# Patient Record
Sex: MALE | Race: OTHER | Employment: FULL TIME | URBAN - METROPOLITAN AREA
[De-identification: names, ages, dates, MRNs, and addresses within clinical notes are randomized per-mention and may not be internally consistent; named-entity substitution may affect disease eponyms.]

---

## 2017-05-11 ENCOUNTER — OFFICE VISIT (OUTPATIENT)
Dept: OTOLARYNGOLOGY | Facility: CLINIC | Age: 62
End: 2017-05-11

## 2017-05-11 VITALS
DIASTOLIC BLOOD PRESSURE: 87 MMHG | HEIGHT: 71 IN | BODY MASS INDEX: 21.7 KG/M2 | TEMPERATURE: 98 F | WEIGHT: 155 LBS | SYSTOLIC BLOOD PRESSURE: 133 MMHG

## 2017-05-11 DIAGNOSIS — H61.20 WAX IN EAR: Primary | ICD-10-CM

## 2017-05-11 PROCEDURE — 69210 REMOVE IMPACTED EAR WAX UNI: CPT | Performed by: OTOLARYNGOLOGY

## 2017-05-11 PROCEDURE — 99202 OFFICE O/P NEW SF 15 MIN: CPT | Performed by: OTOLARYNGOLOGY

## 2017-05-11 NOTE — PROGRESS NOTES
Shlomo Sheffield is a 63year old male. Patient presents with:  Ear Problem: ringing in both ears and pressure in the right ear for 2 weeks         HISTORY OF PRESENT ILLNESS     Here for evaluation of right sided pressure and fluttering sensation. . Patient mmHg  Temp(Src) 97.7 °F (36.5 °C) (Oral)  Ht 5' 11\" (1.803 m)  Wt 155 lb (70.308 kg)  BMI 21.63 kg/m2    System Findings Details   Skin Normal Inspection - Normal. No suspicious lesions bruises or masses.    Constitutional Normal Overall appearance - [facility]

## 2017-08-21 ENCOUNTER — OFFICE VISIT (OUTPATIENT)
Dept: AUDIOLOGY | Facility: CLINIC | Age: 62
End: 2017-08-21

## 2017-08-21 ENCOUNTER — OFFICE VISIT (OUTPATIENT)
Dept: OTOLARYNGOLOGY | Facility: CLINIC | Age: 62
End: 2017-08-21

## 2017-08-21 VITALS
DIASTOLIC BLOOD PRESSURE: 82 MMHG | TEMPERATURE: 97 F | BODY MASS INDEX: 21.7 KG/M2 | HEART RATE: 61 BPM | SYSTOLIC BLOOD PRESSURE: 133 MMHG | HEIGHT: 71 IN | WEIGHT: 155 LBS

## 2017-08-21 DIAGNOSIS — H93.13 TINNITUS OF BOTH EARS: Primary | ICD-10-CM

## 2017-08-21 DIAGNOSIS — H93.13 TINNITUS, BILATERAL: Primary | ICD-10-CM

## 2017-08-21 DIAGNOSIS — H91.93 BILATERAL HEARING LOSS, UNSPECIFIED HEARING LOSS TYPE: ICD-10-CM

## 2017-08-21 DIAGNOSIS — H90.3 SENSORINEURAL HEARING LOSS, BILATERAL: ICD-10-CM

## 2017-08-21 PROCEDURE — 92567 TYMPANOMETRY: CPT | Performed by: AUDIOLOGIST

## 2017-08-21 PROCEDURE — 92557 COMPREHENSIVE HEARING TEST: CPT | Performed by: AUDIOLOGIST

## 2017-08-21 PROCEDURE — 99213 OFFICE O/P EST LOW 20 MIN: CPT | Performed by: OTOLARYNGOLOGY

## 2017-08-21 PROCEDURE — 99212 OFFICE O/P EST SF 10 MIN: CPT | Performed by: OTOLARYNGOLOGY

## 2017-08-21 NOTE — PROGRESS NOTES
Thomas Liang is a 63year old male. Patient presents with: Follow - Up: Tinnitus is the same since getting waxed cleaned out. Walked out without a hearing test at last visit.          HISTORY OF PRESENT ILLNESS  5/11/17   Here for evaluation of right patsy Frequent skin infections, pigment change and rash. Hema/Lymph Negative Easy bleeding and easy bruising.      PHYSICAL EXAM    /82   Pulse 61   Temp (!) 97.2 °F (36.2 °C) (Oral)   Ht 5' 11\" (1.803 m)   Wt 155 lb (70.3 kg)   BMI 21.62 kg/m²     Syste cerebellopontine angle tumor. I usually proceed with an MRI in this case. For symmetric tenderness masking is the only available modality to reduce symptoms in an otherwise nondepressed/anxious patient.  The only medical modality which has proven to be effe

## 2017-08-22 NOTE — PROGRESS NOTES
AUDIOGRAM     Julianne Johnson was referred for testing by Maria Isabel You due to bilateral tinnitus and hearing loss.    10/25/1955  [id number]    Pt noted the tinnitus is not bothersome and he notices it only in quiet surroundings when he is not engaged in a

## 2017-08-24 ENCOUNTER — TELEPHONE (OUTPATIENT)
Dept: NEPHROLOGY | Facility: CLINIC | Age: 62
End: 2017-08-24

## 2017-08-24 DIAGNOSIS — Z01.89 ROUTINE LAB DRAW: ICD-10-CM

## 2017-08-24 DIAGNOSIS — Z12.5 PROSTATE CANCER SCREENING: Primary | ICD-10-CM

## 2017-08-24 RX ORDER — ACYCLOVIR 400 MG/1
TABLET ORAL
Qty: 60 TABLET | Refills: 0 | Status: SHIPPED | OUTPATIENT
Start: 2017-08-24 | End: 2018-12-30

## 2017-08-24 RX ORDER — ZOLPIDEM TARTRATE 10 MG/1
TABLET ORAL
Qty: 30 TABLET | Refills: 0 | Status: CANCELLED | OUTPATIENT
Start: 2017-08-24

## 2017-08-24 NOTE — TELEPHONE ENCOUNTER
Pt called for refills. Aware MKK out of office.       Current Outpatient Prescriptions:   •  Zolpidem Tartrate 10 MG Oral Tab, TAKE 1 TABLET BY MOUTH EVERY NIGHT AT BEDTIME, Disp: 30 tablet, Rfl: 0  •  acyclovir 400 MG Oral Tab, TAKE ONE TABLET TWICE DAILY

## 2017-08-24 NOTE — TELEPHONE ENCOUNTER
Pt called for routine lab orders to be done before appt on 9/22/17. Please call. Aware MKK out of office this week.

## 2017-08-28 RX ORDER — ZOLPIDEM TARTRATE 10 MG/1
TABLET ORAL
Qty: 30 TABLET | Refills: 0 | Status: SHIPPED | OUTPATIENT
Start: 2017-08-28 | End: 2018-12-30

## 2017-08-28 NOTE — TELEPHONE ENCOUNTER
Left detailed message on voicemail--lab work ordered by Dr. Diann Solis. Patient needs to fast 12 hours and do labs a few days prior to appointment on 9/22/17. Requested a call back to confirm that patient got this message.

## 2017-08-30 NOTE — TELEPHONE ENCOUNTER
LMTCB (follow up phone call to see if patient heard the voicemail message left on Monday) Requested a call back to confirm that he got the message and to check if he has any questions or concerns.

## 2017-10-01 ENCOUNTER — LAB ENCOUNTER (OUTPATIENT)
Dept: LAB | Facility: HOSPITAL | Age: 62
End: 2017-10-01
Attending: INTERNAL MEDICINE
Payer: COMMERCIAL

## 2017-10-01 DIAGNOSIS — Z01.89 ROUTINE LAB DRAW: ICD-10-CM

## 2017-10-01 DIAGNOSIS — Z12.5 PROSTATE CANCER SCREENING: ICD-10-CM

## 2017-10-01 PROCEDURE — 80053 COMPREHEN METABOLIC PANEL: CPT

## 2017-10-01 PROCEDURE — 80061 LIPID PANEL: CPT

## 2017-10-01 PROCEDURE — 81003 URINALYSIS AUTO W/O SCOPE: CPT

## 2017-10-01 PROCEDURE — 85025 COMPLETE CBC W/AUTO DIFF WBC: CPT

## 2017-10-01 PROCEDURE — 36415 COLL VENOUS BLD VENIPUNCTURE: CPT

## 2017-10-09 ENCOUNTER — OFFICE VISIT (OUTPATIENT)
Dept: NEPHROLOGY | Facility: CLINIC | Age: 62
End: 2017-10-09

## 2017-10-09 VITALS
WEIGHT: 163 LBS | HEIGHT: 71 IN | DIASTOLIC BLOOD PRESSURE: 86 MMHG | BODY MASS INDEX: 22.82 KG/M2 | SYSTOLIC BLOOD PRESSURE: 144 MMHG | HEART RATE: 61 BPM

## 2017-10-09 DIAGNOSIS — Z00.00 ROUTINE GENERAL MEDICAL EXAMINATION AT A HEALTH CARE FACILITY: Primary | ICD-10-CM

## 2017-10-09 PROCEDURE — 99396 PREV VISIT EST AGE 40-64: CPT | Performed by: INTERNAL MEDICINE

## 2017-10-09 NOTE — PROGRESS NOTES
[facility]  Nephrology Daily Progress Note    Júnior Faust  [id number]  63year old  Patient presents with: Annual      HPI:   Júnior Faust is a 63year old male.   65-year-old male with a history of mild hypercholesterolemia, intermittent herpes, 71.000 - 71.000   BODY MASS INDEX - 22.73 -       VITALS: WEIGHT ONLY 5/11/2017 8/21/2017 10/9/2017   Weight 155 lbs 155 lbs 163 lbs       Constitutional: appears well hydrated alert and responsive no acute distress noted  Neck/Thyroid: neck is supple with Disp: 60 tablet, Rfl: 0    Allergies:  No Known Allergies         ASSESSMENT/PLAN:   Assessment   Routine general medical examination at a health care facility  (primary encounter diagnosis)    Patient Active Problem List:     Routine general medical exami

## 2018-04-10 ENCOUNTER — OFFICE VISIT (OUTPATIENT)
Dept: GASTROENTEROLOGY | Facility: CLINIC | Age: 63
End: 2018-04-10

## 2018-04-10 VITALS
BODY MASS INDEX: 23.24 KG/M2 | SYSTOLIC BLOOD PRESSURE: 133 MMHG | HEIGHT: 71 IN | HEART RATE: 72 BPM | DIASTOLIC BLOOD PRESSURE: 77 MMHG | WEIGHT: 166 LBS

## 2018-04-10 DIAGNOSIS — Z12.11 COLON CANCER SCREENING: Primary | ICD-10-CM

## 2018-04-10 PROCEDURE — 99203 OFFICE O/P NEW LOW 30 MIN: CPT | Performed by: NURSE PRACTITIONER

## 2018-04-10 PROCEDURE — 99212 OFFICE O/P EST SF 10 MIN: CPT | Performed by: NURSE PRACTITIONER

## 2018-04-28 ENCOUNTER — TELEPHONE (OUTPATIENT)
Dept: NEPHROLOGY | Facility: CLINIC | Age: 63
End: 2018-04-28

## 2018-04-28 ENCOUNTER — APPOINTMENT (OUTPATIENT)
Dept: LAB | Facility: HOSPITAL | Age: 63
End: 2018-04-28
Attending: INTERNAL MEDICINE
Payer: COMMERCIAL

## 2018-04-28 DIAGNOSIS — Z00.00 ROUTINE GENERAL MEDICAL EXAMINATION AT A HEALTH CARE FACILITY: ICD-10-CM

## 2018-04-28 DIAGNOSIS — E78.00 PURE HYPERCHOLESTEROLEMIA: Primary | ICD-10-CM

## 2018-04-28 PROCEDURE — 36415 COLL VENOUS BLD VENIPUNCTURE: CPT

## 2018-04-28 PROCEDURE — 82947 ASSAY GLUCOSE BLOOD QUANT: CPT

## 2018-04-28 PROCEDURE — 80061 LIPID PANEL: CPT

## 2018-04-28 NOTE — TELEPHONE ENCOUNTER
FBS normal but chol still too high and higher than before. Start Lipitor 20 mg 1 qd #30, refills 5. Liver and lipid panel in 1 mo. Watch diet and exercise.

## 2018-04-30 RX ORDER — ATORVASTATIN CALCIUM 20 MG/1
20 TABLET, FILM COATED ORAL NIGHTLY
Qty: 30 TABLET | Refills: 5 | Status: SHIPPED | OUTPATIENT
Start: 2018-04-30 | End: 2018-11-13

## 2018-04-30 NOTE — TELEPHONE ENCOUNTER
Patient returned RN call, Message given as written per Dr Guero Waller;, Verbalized understanding of all instructions as given.  New Rx for Lipitor sent to [address] [place] [address] in [facility] as per patient request.

## 2018-05-14 ENCOUNTER — OFFICE VISIT (OUTPATIENT)
Dept: NEPHROLOGY | Facility: CLINIC | Age: 63
End: 2018-05-14

## 2018-05-14 VITALS
HEIGHT: 71 IN | HEART RATE: 60 BPM | DIASTOLIC BLOOD PRESSURE: 78 MMHG | SYSTOLIC BLOOD PRESSURE: 134 MMHG | BODY MASS INDEX: 23.1 KG/M2 | WEIGHT: 165 LBS

## 2018-05-14 DIAGNOSIS — E78.00 HYPERCHOLESTEROLEMIA: Primary | ICD-10-CM

## 2018-05-14 PROCEDURE — 99212 OFFICE O/P EST SF 10 MIN: CPT | Performed by: INTERNAL MEDICINE

## 2018-05-14 PROCEDURE — 99213 OFFICE O/P EST LOW 20 MIN: CPT | Performed by: INTERNAL MEDICINE

## 2018-05-14 NOTE — PATIENT INSTRUCTIONS
Check your blood pressures twice a day and call me in 1 week. Repeat your cholesterol panel 1 month after starting Lipitor.

## 2018-05-14 NOTE — PROGRESS NOTES
[facility], [facility]  Nephrology Daily Progress Note    Júnior Faust  [id number]  63year old  Patient presents with:  Hypertension  Medication Question      HPI:   Júnior Faust is a 63year old male.   80-year-old male with a history of hypercholesterolemia Position Sitting - -   Cuff Size adult - -   Pulse 72 - 60   Temp - - -   Weight 166 lbs - 165 lbs   Height 71.000 - 71.000   BODY MASS INDEX - 23.01 -       VITALS: WEIGHT ONLY 10/9/2017 4/10/2018 5/14/2018   Weight 163 lbs 166 lbs 165 lbs       Constitut diagnosis)    Patient Active Problem List:     Routine general medical examination at a health care facility     Hypercholesterolemia      My nurse got a blood pressure 134/78. I got a blood pressure of 128/82.   Blood pressures may be creeping up from bas

## 2018-05-15 ENCOUNTER — TELEPHONE (OUTPATIENT)
Dept: NEPHROLOGY | Facility: CLINIC | Age: 63
End: 2018-05-15

## 2018-05-15 NOTE — TELEPHONE ENCOUNTER
Pt calling to inform MKK that he will be traveling for work and will submit BP reasdings when he returns. For add'l questions pls call. Thank you.

## 2018-06-15 ENCOUNTER — TELEPHONE (OUTPATIENT)
Dept: NEPHROLOGY | Facility: CLINIC | Age: 63
End: 2018-06-15

## 2018-06-15 NOTE — TELEPHONE ENCOUNTER
MICHAELA reviewed pt's BPs that were sent in (in RN bin). Per MICHAELA, BPs ok. CPM. Do labs as ordered. Attempted to contact pt. LMTCB.

## 2018-06-29 ENCOUNTER — OFFICE VISIT (OUTPATIENT)
Dept: OTOLARYNGOLOGY | Facility: CLINIC | Age: 63
End: 2018-06-29

## 2018-06-29 VITALS — WEIGHT: 157 LBS | TEMPERATURE: 99 F | HEIGHT: 71 IN | BODY MASS INDEX: 21.98 KG/M2

## 2018-06-29 DIAGNOSIS — K21.9 GASTROESOPHAGEAL REFLUX DISEASE, ESOPHAGITIS PRESENCE NOT SPECIFIED: Primary | ICD-10-CM

## 2018-06-29 PROCEDURE — 31575 DIAGNOSTIC LARYNGOSCOPY: CPT | Performed by: OTOLARYNGOLOGY

## 2018-06-29 PROCEDURE — 99212 OFFICE O/P EST SF 10 MIN: CPT | Performed by: OTOLARYNGOLOGY

## 2018-06-29 PROCEDURE — 99213 OFFICE O/P EST LOW 20 MIN: CPT | Performed by: OTOLARYNGOLOGY

## 2018-06-29 RX ORDER — OMEPRAZOLE 40 MG/1
40 CAPSULE, DELAYED RELEASE ORAL DAILY
Qty: 30 CAPSULE | Refills: 3 | Status: SHIPPED | OUTPATIENT
Start: 2018-06-29 | End: 2018-11-15

## 2018-06-29 NOTE — PROGRESS NOTES
Za Herrera is a 63year old male.  Patient presents with:  Throat Problem: pt reports feels something is stuck in throat, feels the need to clear throat when eating and drinking        HISTORY OF PRESENT ILLNESS  5/11/17   Here for evaluation of right s History:  05-: COLONOSCOPY  1990: REPAIR OF NASAL SEPTUM      REVIEW OF SYSTEMS    System Neg/Pos Details   Constitutional Negative fever, weight loss. ENMT Negative Headaches vertigo    Eyes Negative Blurred vision and vision changes.    Respirato min dev . The turbinates were non enlarged and No intranasal polyps were noted. Nasopharynx was free of masses and arlin were patent. Oropharynx was then examined and the patient has a normal tongue base and lingual tonsils.  Epiglottis was  true and false c

## 2018-07-07 ENCOUNTER — APPOINTMENT (OUTPATIENT)
Dept: LAB | Facility: HOSPITAL | Age: 63
End: 2018-07-07
Attending: INTERNAL MEDICINE
Payer: COMMERCIAL

## 2018-07-07 DIAGNOSIS — E78.00 PURE HYPERCHOLESTEROLEMIA: ICD-10-CM

## 2018-07-07 LAB
ALBUMIN SERPL BCP-MCNC: 4 G/DL (ref 3.5–4.8)
ALP SERPL-CCNC: 68 U/L (ref 32–100)
ALT SERPL-CCNC: 21 U/L (ref 17–63)
AST SERPL-CCNC: 20 U/L (ref 15–41)
BILIRUB DIRECT SERPL-MCNC: 0.2 MG/DL (ref 0–0.2)
BILIRUB SERPL-MCNC: 1.6 MG/DL (ref 0.3–1.2)
CHOLEST SERPL-MCNC: 145 MG/DL (ref 110–200)
HDLC SERPL-MCNC: 57 MG/DL
LDLC SERPL CALC-MCNC: 70 MG/DL (ref 0–99)
NONHDLC SERPL-MCNC: 88 MG/DL
PROT SERPL-MCNC: 6.5 G/DL (ref 5.9–8.4)
TRIGL SERPL-MCNC: 91 MG/DL (ref 1–149)

## 2018-07-07 PROCEDURE — 36415 COLL VENOUS BLD VENIPUNCTURE: CPT

## 2018-07-07 PROCEDURE — 80076 HEPATIC FUNCTION PANEL: CPT

## 2018-07-07 PROCEDURE — 80061 LIPID PANEL: CPT

## 2018-11-13 RX ORDER — ATORVASTATIN CALCIUM 20 MG/1
20 TABLET, FILM COATED ORAL NIGHTLY
Qty: 30 TABLET | Refills: 2 | Status: SHIPPED | OUTPATIENT
Start: 2018-11-13 | End: 2019-02-11

## 2018-11-15 RX ORDER — OMEPRAZOLE 40 MG/1
CAPSULE, DELAYED RELEASE ORAL
Qty: 30 CAPSULE | Refills: 0 | Status: SHIPPED | OUTPATIENT
Start: 2018-11-15 | End: 2020-11-06

## 2018-12-30 DIAGNOSIS — E78.00 HYPERCHOLESTEROLEMIA: Primary | ICD-10-CM

## 2018-12-30 DIAGNOSIS — Z00.00 ROUTINE GENERAL MEDICAL EXAMINATION AT A HEALTH CARE FACILITY: ICD-10-CM

## 2018-12-30 DIAGNOSIS — Z12.5 SCREENING FOR PROSTATE CANCER: ICD-10-CM

## 2018-12-31 RX ORDER — ACYCLOVIR 400 MG/1
TABLET ORAL
Qty: 60 TABLET | Refills: 0 | Status: SHIPPED | OUTPATIENT
Start: 2018-12-31 | End: 2019-08-11

## 2018-12-31 RX ORDER — ZOLPIDEM TARTRATE 10 MG/1
TABLET ORAL
Qty: 30 TABLET | Refills: 0 | Status: SHIPPED | OUTPATIENT
Start: 2018-12-31 | End: 2019-08-11

## 2019-01-10 NOTE — TELEPHONE ENCOUNTER
[employer] msg unread. Contacted pt. Notified him that [facility] has ordered fasting labs and to schedule appt to see him. Pt stated understanding & is willing to do. He will do labs and will schedule appt on KBLE as he's in a meeting right now.

## 2019-01-26 ENCOUNTER — LAB ENCOUNTER (OUTPATIENT)
Dept: LAB | Facility: HOSPITAL | Age: 64
End: 2019-01-26
Attending: INTERNAL MEDICINE
Payer: COMMERCIAL

## 2019-01-26 DIAGNOSIS — Z00.00 ROUTINE GENERAL MEDICAL EXAMINATION AT A HEALTH CARE FACILITY: ICD-10-CM

## 2019-01-26 DIAGNOSIS — E78.00 HYPERCHOLESTEROLEMIA: ICD-10-CM

## 2019-01-26 DIAGNOSIS — Z12.5 SCREENING FOR PROSTATE CANCER: ICD-10-CM

## 2019-01-26 LAB
ALBUMIN SERPL BCP-MCNC: 4.1 G/DL (ref 3.5–4.8)
ALBUMIN/GLOB SERPL: 1.6 {RATIO} (ref 1–2)
ALP SERPL-CCNC: 76 U/L (ref 32–100)
ALT SERPL-CCNC: 23 U/L (ref 17–63)
ANION GAP SERPL CALC-SCNC: 9 MMOL/L (ref 0–18)
AST SERPL-CCNC: 22 U/L (ref 15–41)
BASOPHILS # BLD: 0 K/UL (ref 0–0.2)
BASOPHILS NFR BLD: 1 %
BILIRUB SERPL-MCNC: 1.5 MG/DL (ref 0.3–1.2)
BILIRUB UR QL: NEGATIVE
BUN SERPL-MCNC: 11 MG/DL (ref 8–20)
BUN/CREAT SERPL: 10.8 (ref 10–20)
CALCIUM SERPL-MCNC: 9.4 MG/DL (ref 8.5–10.5)
CHLORIDE SERPL-SCNC: 103 MMOL/L (ref 95–110)
CHOLEST SERPL-MCNC: 140 MG/DL (ref 110–200)
CO2 SERPL-SCNC: 25 MMOL/L (ref 22–32)
COLOR UR: YELLOW
COMPLEXED PSA SERPL-MCNC: 0.8 NG/ML (ref 0.01–4)
CREAT SERPL-MCNC: 1.02 MG/DL (ref 0.5–1.5)
EOSINOPHIL # BLD: 0.1 K/UL (ref 0–0.7)
EOSINOPHIL NFR BLD: 2 %
ERYTHROCYTE [DISTWIDTH] IN BLOOD BY AUTOMATED COUNT: 13.4 % (ref 11–15)
GLOBULIN PLAS-MCNC: 2.5 G/DL (ref 2.5–3.7)
GLUCOSE SERPL-MCNC: 103 MG/DL (ref 70–99)
GLUCOSE UR-MCNC: NEGATIVE MG/DL
HCT VFR BLD AUTO: 45.7 % (ref 41–52)
HDLC SERPL-MCNC: 60 MG/DL
HGB BLD-MCNC: 15.3 G/DL (ref 13.5–17.5)
HGB UR QL STRIP.AUTO: NEGATIVE
KETONES UR-MCNC: NEGATIVE MG/DL
LDLC SERPL CALC-MCNC: 70 MG/DL (ref 0–99)
LEUKOCYTE ESTERASE UR QL STRIP.AUTO: NEGATIVE
LYMPHOCYTES # BLD: 2.5 K/UL (ref 1–4)
LYMPHOCYTES NFR BLD: 49 %
MCH RBC QN AUTO: 30.2 PG (ref 27–32)
MCHC RBC AUTO-ENTMCNC: 33.4 G/DL (ref 32–37)
MCV RBC AUTO: 90.5 FL (ref 80–100)
MONOCYTES # BLD: 0.4 K/UL (ref 0–1)
MONOCYTES NFR BLD: 8 %
NEUTROPHILS # BLD AUTO: 2.1 K/UL (ref 1.8–7.7)
NEUTROPHILS NFR BLD: 41 %
NITRITE UR QL STRIP.AUTO: NEGATIVE
NONHDLC SERPL-MCNC: 80 MG/DL
OSMOLALITY UR CALC.SUM OF ELEC: 284 MOSM/KG (ref 275–295)
PATIENT FASTING: YES
PH UR: 7 [PH] (ref 5–8)
PLATELET # BLD AUTO: 195 K/UL (ref 140–400)
PMV BLD AUTO: 8.6 FL (ref 7.4–10.3)
POTASSIUM SERPL-SCNC: 4.1 MMOL/L (ref 3.3–5.1)
PROT SERPL-MCNC: 6.6 G/DL (ref 5.9–8.4)
PROT UR-MCNC: NEGATIVE MG/DL
PSA SERPL-MCNC: 0.7 NG/ML (ref 0–4)
RBC # BLD AUTO: 5.05 M/UL (ref 4.5–5.9)
RBC #/AREA URNS AUTO: 4 /HPF
SODIUM SERPL-SCNC: 137 MMOL/L (ref 136–144)
SP GR UR STRIP: 1.03 (ref 1–1.03)
TRIGL SERPL-MCNC: 51 MG/DL (ref 1–149)
UROBILINOGEN UR STRIP-ACNC: <2
VIT C UR-MCNC: NEGATIVE MG/DL
WBC # BLD AUTO: 5.2 K/UL (ref 4–11)
WBC #/AREA URNS AUTO: 3 /HPF

## 2019-01-26 PROCEDURE — 80061 LIPID PANEL: CPT

## 2019-01-26 PROCEDURE — 36415 COLL VENOUS BLD VENIPUNCTURE: CPT

## 2019-01-26 PROCEDURE — 85025 COMPLETE CBC W/AUTO DIFF WBC: CPT

## 2019-01-26 PROCEDURE — 81001 URINALYSIS AUTO W/SCOPE: CPT

## 2019-01-26 PROCEDURE — 80053 COMPREHEN METABOLIC PANEL: CPT

## 2019-02-06 ENCOUNTER — OFFICE VISIT (OUTPATIENT)
Dept: NEPHROLOGY | Facility: CLINIC | Age: 64
End: 2019-02-06
Payer: COMMERCIAL

## 2019-02-06 VITALS
HEART RATE: 62 BPM | HEIGHT: 71 IN | DIASTOLIC BLOOD PRESSURE: 84 MMHG | SYSTOLIC BLOOD PRESSURE: 131 MMHG | BODY MASS INDEX: 22.4 KG/M2 | WEIGHT: 160 LBS

## 2019-02-06 DIAGNOSIS — E78.00 HYPERCHOLESTEROLEMIA: ICD-10-CM

## 2019-02-06 DIAGNOSIS — Z00.00 ROUTINE GENERAL MEDICAL EXAMINATION AT A HEALTH CARE FACILITY: Primary | ICD-10-CM

## 2019-02-06 DIAGNOSIS — R73.9 HYPERGLYCEMIA: ICD-10-CM

## 2019-02-06 PROCEDURE — 99396 PREV VISIT EST AGE 40-64: CPT | Performed by: INTERNAL MEDICINE

## 2019-02-06 NOTE — PROGRESS NOTES
[facility], [facility]  Nephrology Daily Progress Note    Maisha Gomez  [id number]  66year old  Patient presents with: Annual      HPI:   Maisha Gomez is a 66year old male.   53-year-old male with a history of hypercholesterolemia, intermittent herpes and i 2/6/2019   BP - - 131/84   BP Location - - Left arm   Patient Position - - Sitting   Cuff Size - - large   Pulse - - 62   Temp 98.6 - -   Weight 157 lbs - 160 lbs   Height 71.000 - 71.000   BODY MASS INDEX - 22.32 -       VITALS: WEIGHT ONLY 5/14/2018 6/29 tablet, Rfl: 0  •  atorvastatin 20 MG Oral Tab, Take 1 tablet (20 mg total) by mouth nightly., Disp: 30 tablet, Rfl: 2  •  Cholecalciferol (VITAMIN D) 1000 UNITS Oral Cap, Take 1 tablet by mouth daily. , Disp: , Rfl:   •  Wheat Dextrin ([place]

## 2019-02-07 NOTE — PATIENT INSTRUCTIONS
If you buy a new blood pressure machine check your blood pressures daily and call me in a week or 2. Repeat your labs in 1 month. Fast for 6 hours.

## 2019-02-11 RX ORDER — ATORVASTATIN CALCIUM 20 MG/1
TABLET, FILM COATED ORAL
Qty: 30 TABLET | Refills: 5 | Status: SHIPPED | OUTPATIENT
Start: 2019-02-11 | End: 2019-06-14

## 2019-03-08 ENCOUNTER — TELEPHONE (OUTPATIENT)
Dept: NEPHROLOGY | Facility: CLINIC | Age: 64
End: 2019-03-08

## 2019-03-25 ENCOUNTER — TELEPHONE (OUTPATIENT)
Dept: GASTROENTEROLOGY | Facility: CLINIC | Age: 64
End: 2019-03-25

## 2019-06-14 RX ORDER — ATORVASTATIN CALCIUM 20 MG/1
TABLET, FILM COATED ORAL
Qty: 90 TABLET | Refills: 0 | Status: SHIPPED | OUTPATIENT
Start: 2019-06-14 | End: 2019-11-07

## 2019-06-14 NOTE — TELEPHONE ENCOUNTER
LOV 2/6/19. RTC in 6 mos (8/2019) Last lipid panel was done on 1/26/19. Refill pended and routed to Dr. Ramona Solo.

## 2019-07-15 ENCOUNTER — APPOINTMENT (OUTPATIENT)
Dept: LAB | Facility: HOSPITAL | Age: 64
End: 2019-07-15
Attending: INTERNAL MEDICINE
Payer: COMMERCIAL

## 2019-07-15 DIAGNOSIS — E78.00 HYPERCHOLESTEROLEMIA: ICD-10-CM

## 2019-07-15 DIAGNOSIS — Z00.00 ROUTINE GENERAL MEDICAL EXAMINATION AT A HEALTH CARE FACILITY: ICD-10-CM

## 2019-07-15 DIAGNOSIS — R73.9 HYPERGLYCEMIA: ICD-10-CM

## 2019-07-15 LAB
BILIRUB UR QL: NEGATIVE
CLARITY UR: CLEAR
COLOR UR: YELLOW
EST. AVERAGE GLUCOSE BLD GHB EST-MCNC: 108 MG/DL (ref 68–126)
GLUCOSE BLD-MCNC: 92 MG/DL (ref 70–99)
GLUCOSE UR-MCNC: NEGATIVE MG/DL
HBA1C MFR BLD HPLC: 5.4 % (ref ?–5.7)
HGB UR QL STRIP.AUTO: NEGATIVE
KETONES UR-MCNC: NEGATIVE MG/DL
LEUKOCYTE ESTERASE UR QL STRIP.AUTO: NEGATIVE
NITRITE UR QL STRIP.AUTO: NEGATIVE
PATIENT FASTING: YES
PH UR: 7 [PH] (ref 5–8)
PROT UR-MCNC: NEGATIVE MG/DL
SP GR UR STRIP: 1.02 (ref 1–1.03)
UROBILINOGEN UR STRIP-ACNC: <2
VIT C UR-MCNC: NEGATIVE MG/DL

## 2019-07-15 PROCEDURE — 81003 URINALYSIS AUTO W/O SCOPE: CPT

## 2019-07-15 PROCEDURE — 36415 COLL VENOUS BLD VENIPUNCTURE: CPT

## 2019-07-15 PROCEDURE — 87086 URINE CULTURE/COLONY COUNT: CPT

## 2019-07-15 PROCEDURE — 82947 ASSAY GLUCOSE BLOOD QUANT: CPT

## 2019-07-15 PROCEDURE — 83036 HEMOGLOBIN GLYCOSYLATED A1C: CPT

## 2019-08-12 RX ORDER — ZOLPIDEM TARTRATE 10 MG/1
TABLET ORAL
Qty: 30 TABLET | Refills: 0 | Status: SHIPPED | OUTPATIENT
Start: 2019-08-12 | End: 2020-06-22

## 2019-08-12 RX ORDER — ACYCLOVIR 400 MG/1
TABLET ORAL
Qty: 60 TABLET | Refills: 0 | Status: SHIPPED | OUTPATIENT
Start: 2019-08-12 | End: 2020-06-22

## 2019-08-12 NOTE — TELEPHONE ENCOUNTER
LOV 2/6/19. RTC in 6 mos (8/2019) No f/u scheduled. Refills pended and routed to Dr. Vy Lagos to approve.

## 2019-08-13 ENCOUNTER — PATIENT MESSAGE (OUTPATIENT)
Dept: NEPHROLOGY | Facility: CLINIC | Age: 64
End: 2019-08-13

## 2019-08-13 ENCOUNTER — TELEPHONE (OUTPATIENT)
Dept: NEPHROLOGY | Facility: CLINIC | Age: 64
End: 2019-08-13

## 2019-08-13 NOTE — TELEPHONE ENCOUNTER
[facility] calling regarding rx:Zolipidem, control substance needs to confirm it was sent from our office, pls call at:[phone],thanks.

## 2019-08-13 NOTE — TELEPHONE ENCOUNTER
From: Jeronimo Quiñonez  To: Snehal Collazo MD  Sent: 8/13/2019 10:01 AM CDT  Subject: Prescription Question    I got the message below. I'm not sure how to proceed. Should I schedule an appointment? Is there an issue with this prescription?     Thanks,  WDM

## 2019-11-06 ENCOUNTER — OFFICE VISIT (OUTPATIENT)
Dept: NEPHROLOGY | Facility: CLINIC | Age: 64
End: 2019-11-06
Payer: COMMERCIAL

## 2019-11-06 VITALS
HEART RATE: 58 BPM | SYSTOLIC BLOOD PRESSURE: 119 MMHG | DIASTOLIC BLOOD PRESSURE: 69 MMHG | WEIGHT: 158.81 LBS | HEIGHT: 71 IN | BODY MASS INDEX: 22.23 KG/M2

## 2019-11-06 DIAGNOSIS — I10 ESSENTIAL HYPERTENSION: Primary | ICD-10-CM

## 2019-11-06 DIAGNOSIS — E78.00 HYPERCHOLESTEROLEMIA: ICD-10-CM

## 2019-11-06 DIAGNOSIS — K21.9 GASTROESOPHAGEAL REFLUX DISEASE, ESOPHAGITIS PRESENCE NOT SPECIFIED: ICD-10-CM

## 2019-11-06 PROCEDURE — 90471 IMMUNIZATION ADMIN: CPT | Performed by: INTERNAL MEDICINE

## 2019-11-06 PROCEDURE — 99214 OFFICE O/P EST MOD 30 MIN: CPT | Performed by: INTERNAL MEDICINE

## 2019-11-06 PROCEDURE — 90686 IIV4 VACC NO PRSV 0.5 ML IM: CPT | Performed by: INTERNAL MEDICINE

## 2019-11-06 RX ORDER — HYDROCHLOROTHIAZIDE 12.5 MG/1
12.5 CAPSULE, GELATIN COATED ORAL DAILY
Qty: 30 CAPSULE | Refills: 5 | Status: SHIPPED | OUTPATIENT
Start: 2019-11-06 | End: 2020-08-10

## 2019-11-07 NOTE — PATIENT INSTRUCTIONS
Start hydrochlorothiazide. Check your blood pressures daily and call me in 2 weeks. Follow-up with Dr. Pamela Mendiola regarding your colonoscopy but also discuss your heartburn symptoms as well.

## 2019-11-07 NOTE — PROGRESS NOTES
[facility]  Nephrology Daily Progress Note    Simba Diez  [id number]  60year old      HPI:   Simba Diez is a 60year old male.   24-year-old male with a history of hypercholesterolemia, intermittent herpes, GERD, and occasional inso Weight   11/06/19 1612 158 lb 12.8 oz (72 kg)       Constitutional: appears well hydrated alert and responsive no acute distress noted  Neck/Thyroid: neck is supple without adenopathy  Lymphatic: no abnormal cervical, supraclavicular or axillary adenopathy

## 2019-11-08 RX ORDER — ATORVASTATIN CALCIUM 20 MG/1
TABLET, FILM COATED ORAL
Qty: 90 TABLET | Refills: 0 | Status: SHIPPED | OUTPATIENT
Start: 2019-11-08 | End: 2020-02-03

## 2020-02-03 RX ORDER — ATORVASTATIN CALCIUM 20 MG/1
TABLET, FILM COATED ORAL
Qty: 90 TABLET | Refills: 0 | Status: SHIPPED | OUTPATIENT
Start: 2020-02-03 | End: 2020-05-04

## 2020-02-05 NOTE — TELEPHONE ENCOUNTER
Pt has been checking BP but not regularly. Pt will take BPs this week and send the log through [employer].

## 2020-02-25 ENCOUNTER — PATIENT MESSAGE (OUTPATIENT)
Dept: NEPHROLOGY | Facility: CLINIC | Age: 65
End: 2020-02-25

## 2020-02-26 RX ORDER — LOSARTAN POTASSIUM 25 MG/1
25 TABLET ORAL DAILY
Qty: 30 TABLET | Refills: 5 | Status: SHIPPED | OUTPATIENT
Start: 2020-02-26 | End: 2020-10-26

## 2020-02-26 NOTE — TELEPHONE ENCOUNTER
Blood pressures still occasionally high. CPM and add losartan 25 mg, 1 daily, #30, refills 5. Call in 2 weeks with blood pressures. Low-salt diet.

## 2020-03-26 ENCOUNTER — TELEPHONE (OUTPATIENT)
Dept: NEPHROLOGY | Facility: CLINIC | Age: 65
End: 2020-03-26

## 2020-03-26 NOTE — TELEPHONE ENCOUNTER
Contacted pt. Reports having mild left leg pain that started about a month ago. Takes statin daily. Pain started after he had missed 2 doses of statin in a 4-day period. Also a runner.  When pain first started, couldn't run due to pain; would get pain throu

## 2020-03-26 NOTE — TELEPHONE ENCOUNTER
It is unlikely that Lipitor is causing the symptoms. He has been on that medicine for years. Usually those medications cause diffuse muscle aches and not just localized pain to 1 part of his leg. May just be a muscle strain. Symptoms seem very mild.   Azeem Arias

## 2020-03-26 NOTE — TELEPHONE ENCOUNTER
Contacted pt and notified him of Dr. Luke Martin message below. He has a scheduled appt with Dr. Quique Beck for next month so he will plan on keeping this appt for now. Pt advised to call if symptoms change or worsen.

## 2020-03-26 NOTE — TELEPHONE ENCOUNTER
Pt states that he is having pain in left leg and he thinks it might be due to cholesterol medication. Please call. Aware MKK out of office today.

## 2020-04-22 ENCOUNTER — TELEPHONE (OUTPATIENT)
Dept: NEPHROLOGY | Facility: CLINIC | Age: 65
End: 2020-04-22

## 2020-04-22 NOTE — TELEPHONE ENCOUNTER
Spoke with patient who states leg pan persists, but is slightly better. Denies swelling or area being warm to touch.  Joy Scale to do a video visit if permissible but will do a phone visitt at your request.

## 2020-04-24 ENCOUNTER — VIRTUAL PHONE E/M (OUTPATIENT)
Dept: NEPHROLOGY | Facility: CLINIC | Age: 65
End: 2020-04-24

## 2020-04-24 DIAGNOSIS — I10 ESSENTIAL HYPERTENSION: ICD-10-CM

## 2020-04-24 DIAGNOSIS — M79.605 PAIN OF LEFT LOWER EXTREMITY: Primary | ICD-10-CM

## 2020-04-24 PROCEDURE — 99213 OFFICE O/P EST LOW 20 MIN: CPT | Performed by: INTERNAL MEDICINE

## 2020-04-24 NOTE — PATIENT INSTRUCTIONS
Continue to monitor your blood pressures. For now just use hydrochlorothiazide. Let me know if your blood pressure should increase. I think you can gradually return to normal activities but let me know if your left lower extremity symptoms return.

## 2020-04-24 NOTE — PROGRESS NOTES
Virtual Telephone Check-In    Ashley Eid verbally consents to a Virtual/Telephone Check-In visit on 04/24/20. Patient understands and accepts financial responsibility for any deductible, co-insurance and/or co-pays associated with this service.     D lower extremity    Essential hypertension          Kerney Bence, MD

## 2020-05-02 DIAGNOSIS — E78.00 HYPERCHOLESTEREMIA: Primary | ICD-10-CM

## 2020-05-02 DIAGNOSIS — I10 ESSENTIAL HYPERTENSION: ICD-10-CM

## 2020-05-04 ENCOUNTER — TELEPHONE (OUTPATIENT)
Dept: NEPHROLOGY | Facility: CLINIC | Age: 65
End: 2020-05-04

## 2020-05-04 DIAGNOSIS — M79.662 PAIN IN LEFT LOWER LEG: Primary | ICD-10-CM

## 2020-05-04 RX ORDER — ATORVASTATIN CALCIUM 20 MG/1
TABLET, FILM COATED ORAL
Qty: 90 TABLET | Refills: 0 | Status: SHIPPED | OUTPATIENT
Start: 2020-05-04 | End: 2020-08-04

## 2020-05-04 NOTE — TELEPHONE ENCOUNTER
Patient states his left leg is still bothering him and requesting to see if Dr Jose G Jean wanted to order some tests and whatelse he can do to alleviate pain. Please call. Thank you.

## 2020-05-04 NOTE — TELEPHONE ENCOUNTER
X-ray his left calf. Tell him though that if this is normal then will need an MRI of his lumbar spine.

## 2020-05-04 NOTE — TELEPHONE ENCOUNTER
Last seen 4/24/2020. Last lipid panel was done on 1/26/19. Refill pended and routed to Dr. Fernanda Manjarrez.

## 2020-05-04 NOTE — TELEPHONE ENCOUNTER
Called the patient. Spoke with his wife. Informed her of xray order and possible need for MRI. She verbalized her understanding and will inform Domo Castellano entered for xray with assistance from xray department.

## 2020-05-05 NOTE — TELEPHONE ENCOUNTER
Spoke to patient. Instructed to do fasting labs. Patient verbalized understanding of message and is agreeable. Labs orders entered.

## 2020-05-09 ENCOUNTER — LAB ENCOUNTER (OUTPATIENT)
Dept: LAB | Facility: HOSPITAL | Age: 65
End: 2020-05-09
Attending: INTERNAL MEDICINE
Payer: COMMERCIAL

## 2020-05-09 ENCOUNTER — HOSPITAL ENCOUNTER (OUTPATIENT)
Dept: GENERAL RADIOLOGY | Facility: HOSPITAL | Age: 65
Discharge: HOME | End: 2020-05-09
Attending: INTERNAL MEDICINE
Payer: COMMERCIAL

## 2020-05-09 DIAGNOSIS — E78.00 HYPERCHOLESTEREMIA: ICD-10-CM

## 2020-05-09 DIAGNOSIS — I10 ESSENTIAL HYPERTENSION: ICD-10-CM

## 2020-05-09 DIAGNOSIS — M79.662 PAIN IN LEFT LOWER LEG: ICD-10-CM

## 2020-05-09 PROCEDURE — 73590 X-RAY EXAM OF LOWER LEG: CPT | Performed by: INTERNAL MEDICINE

## 2020-05-09 PROCEDURE — 80053 COMPREHEN METABOLIC PANEL: CPT

## 2020-05-09 PROCEDURE — 85025 COMPLETE CBC W/AUTO DIFF WBC: CPT

## 2020-05-09 PROCEDURE — 81003 URINALYSIS AUTO W/O SCOPE: CPT

## 2020-05-09 PROCEDURE — 80061 LIPID PANEL: CPT

## 2020-05-09 PROCEDURE — 36415 COLL VENOUS BLD VENIPUNCTURE: CPT

## 2020-05-10 ENCOUNTER — TELEPHONE (OUTPATIENT)
Dept: NEPHROLOGY | Facility: CLINIC | Age: 65
End: 2020-05-10

## 2020-05-27 ENCOUNTER — TELEPHONE (OUTPATIENT)
Dept: NEPHROLOGY | Facility: CLINIC | Age: 65
End: 2020-05-27

## 2020-05-27 DIAGNOSIS — M79.606 LOWER EXTREMITY PAIN, INFERIOR, UNSPECIFIED LATERALITY: ICD-10-CM

## 2020-05-27 DIAGNOSIS — M54.50 ACUTE LOW BACK PAIN, UNSPECIFIED BACK PAIN LATERALITY, UNSPECIFIED WHETHER SCIATICA PRESENT: Primary | ICD-10-CM

## 2020-06-05 ENCOUNTER — HOSPITAL ENCOUNTER (OUTPATIENT)
Dept: MRI IMAGING | Facility: HOSPITAL | Age: 65
Discharge: HOME | End: 2020-06-05
Attending: INTERNAL MEDICINE
Payer: COMMERCIAL

## 2020-06-05 DIAGNOSIS — M54.50 ACUTE LOW BACK PAIN, UNSPECIFIED BACK PAIN LATERALITY, UNSPECIFIED WHETHER SCIATICA PRESENT: ICD-10-CM

## 2020-06-05 DIAGNOSIS — M79.606 LOWER EXTREMITY PAIN, INFERIOR, UNSPECIFIED LATERALITY: ICD-10-CM

## 2020-06-05 PROCEDURE — 72148 MRI LUMBAR SPINE W/O DYE: CPT | Performed by: INTERNAL MEDICINE

## 2020-06-07 ENCOUNTER — TELEPHONE (OUTPATIENT)
Dept: NEPHROLOGY | Facility: CLINIC | Age: 65
End: 2020-06-07

## 2020-06-08 NOTE — TELEPHONE ENCOUNTER
Patient contacted. Results message read. Patient states he is still having pain which has now moved to his right foot. Ortho phone# provided for patient to call and schedule a consult.

## 2020-06-24 RX ORDER — ACYCLOVIR 400 MG/1
TABLET ORAL
Qty: 60 TABLET | Refills: 2 | Status: SHIPPED | OUTPATIENT
Start: 2020-06-24 | End: 2020-11-06

## 2020-06-24 RX ORDER — ZOLPIDEM TARTRATE 10 MG/1
TABLET ORAL
Qty: 30 TABLET | Refills: 0 | Status: SHIPPED | OUTPATIENT
Start: 2020-06-24 | End: 2020-11-06

## 2020-08-03 NOTE — TELEPHONE ENCOUNTER
Last seen 4/24/2020 (Virtual) Last lipid panel was done on 5/9/2020. Return to clinic in 3 mos (7/2020) Followup scheduled for 8/17/2020. Refill(s) pended and routed to Dr. Danielle Hernández.

## 2020-08-04 ENCOUNTER — OFFICE VISIT (OUTPATIENT)
Dept: CARDIOLOGY CLINIC | Facility: CLINIC | Age: 65
End: 2020-08-04
Payer: COMMERCIAL

## 2020-08-04 VITALS
HEART RATE: 73 BPM | WEIGHT: 154.63 LBS | DIASTOLIC BLOOD PRESSURE: 88 MMHG | BODY MASS INDEX: 22 KG/M2 | SYSTOLIC BLOOD PRESSURE: 130 MMHG

## 2020-08-04 DIAGNOSIS — E78.49 OTHER HYPERLIPIDEMIA: Primary | ICD-10-CM

## 2020-08-04 DIAGNOSIS — Z01.818 ENCOUNTER FOR PREADMISSION TESTING: ICD-10-CM

## 2020-08-04 PROCEDURE — 3075F SYST BP GE 130 - 139MM HG: CPT | Performed by: NURSE PRACTITIONER

## 2020-08-04 PROCEDURE — 99214 OFFICE O/P EST MOD 30 MIN: CPT | Performed by: NURSE PRACTITIONER

## 2020-08-04 PROCEDURE — 93000 ELECTROCARDIOGRAM COMPLETE: CPT | Performed by: NURSE PRACTITIONER

## 2020-08-04 PROCEDURE — 3079F DIAST BP 80-89 MM HG: CPT | Performed by: NURSE PRACTITIONER

## 2020-08-04 RX ORDER — ATORVASTATIN CALCIUM 20 MG/1
TABLET, FILM COATED ORAL
Qty: 90 TABLET | Refills: 0 | Status: SHIPPED | OUTPATIENT
Start: 2020-08-04 | End: 2020-11-06

## 2020-08-04 NOTE — PROGRESS NOTES
Pamela Downs is a 60year old male. Patient presents with:  Consult: EKG -     HPI:   Patient comes in today for consultation. He sees Dr. Jose Issa he has a history of hypertension hyperlipidemia both are well controlled.   He is here today because he had Hyperlipidemia    • Lipid screening 05-      Social History:  Social History    Tobacco Use      Smoking status: Never Smoker      Smokeless tobacco: Never Used    Alcohol use:  Yes      Alcohol/week: 0.0 standard drinks      Comment: mac Mary to return in as needed.       Kenzie Zarate, SONIA  8/4/2020  9:36 AM

## 2020-08-10 RX ORDER — HYDROCHLOROTHIAZIDE 12.5 MG/1
12.5 CAPSULE, GELATIN COATED ORAL DAILY
Qty: 30 CAPSULE | Refills: 5 | Status: SHIPPED | OUTPATIENT
Start: 2020-08-10 | End: 2020-11-06

## 2020-08-17 ENCOUNTER — VIRTUAL PHONE E/M (OUTPATIENT)
Dept: NEPHROLOGY | Facility: CLINIC | Age: 65
End: 2020-08-17

## 2020-08-17 DIAGNOSIS — M54.2 NECK PAIN: ICD-10-CM

## 2020-08-17 DIAGNOSIS — E78.00 HYPERCHOLESTEREMIA: ICD-10-CM

## 2020-08-17 DIAGNOSIS — I10 ESSENTIAL HYPERTENSION: Primary | ICD-10-CM

## 2020-08-17 PROCEDURE — 99214 OFFICE O/P EST MOD 30 MIN: CPT | Performed by: INTERNAL MEDICINE

## 2020-08-20 NOTE — PROGRESS NOTES
Virtual Telephone Check-In    Neo Even verbally consents to a Virtual/Telephone Check-In visit on 08/20/20. Patient has been referred to the [facility] website at [email or web address] to review the yearly Consent to Treat document.     Patient unders now resolving spontaneously. He will do the stress test though just to make sure. Reassurance that I do not think he has Lyme's disease. He does check his blood pressures regularly and they appear to be under good control. Return in 6 months.       Diag

## 2020-08-31 ENCOUNTER — TELEPHONE (OUTPATIENT)
Dept: NEPHROLOGY | Facility: CLINIC | Age: 65
End: 2020-08-31

## 2020-08-31 NOTE — TELEPHONE ENCOUNTER
Patient called in to see if Dr. Fernanda Manjarrez would be willing to put an order in for his son who is 22years old for a Covid-19 test. Son lives in [place] but will be home for awhile in September.  Son is not a patient of Dr. Fernanda Manjarrez but Patient would like to

## 2020-08-31 NOTE — TELEPHONE ENCOUNTER
Why does he want it done. And does he want the antibody test or the test to see whether he has COVID-19.

## 2020-09-01 NOTE — TELEPHONE ENCOUNTER
The order is for his son who lives in [place]. If son is not a patient Im not sure it can be ordered.

## 2020-09-01 NOTE — TELEPHONE ENCOUNTER
Let him know that we cannot order it if he is not a registered patient here. If they are concerned he should be checked before he flies from [place] and he should contact his doctor in [place].

## 2020-09-03 NOTE — TELEPHONE ENCOUNTER
Patient contacted. Dr. Vanessa Singleton message relayed. Patient is going to check in the city to find testing.

## 2020-09-20 ENCOUNTER — LAB ENCOUNTER (OUTPATIENT)
Dept: LAB | Facility: HOSPITAL | Age: 65
End: 2020-09-20
Attending: NURSE PRACTITIONER
Payer: COMMERCIAL

## 2020-09-20 DIAGNOSIS — Z01.812 PRE-PROCEDURE LAB EXAM: Primary | ICD-10-CM

## 2020-09-21 LAB — SARS-COV-2 RNA RESP QL NAA+PROBE: NOT DETECTED

## 2020-09-23 ENCOUNTER — HOSPITAL ENCOUNTER (OUTPATIENT)
Dept: CV DIAGNOSTICS | Facility: HOSPITAL | Age: 65
Discharge: HOME | End: 2020-09-23
Attending: NURSE PRACTITIONER
Payer: COMMERCIAL

## 2020-09-23 DIAGNOSIS — E78.49 OTHER HYPERLIPIDEMIA: ICD-10-CM

## 2020-09-23 PROCEDURE — 93018 CV STRESS TEST I&R ONLY: CPT | Performed by: NURSE PRACTITIONER

## 2020-09-23 PROCEDURE — 93016 CV STRESS TEST SUPVJ ONLY: CPT | Performed by: NURSE PRACTITIONER

## 2020-09-23 PROCEDURE — 93017 CV STRESS TEST TRACING ONLY: CPT | Performed by: NURSE PRACTITIONER

## 2020-09-29 ENCOUNTER — TELEPHONE (OUTPATIENT)
Dept: CARDIOLOGY CLINIC | Facility: CLINIC | Age: 65
End: 2020-09-29

## 2020-09-29 DIAGNOSIS — R94.39 ABNORMAL STRESS TEST: Primary | ICD-10-CM

## 2020-09-29 NOTE — TELEPHONE ENCOUNTER
SONIA Alex Em Cardio Clinical Staff             Stress test is abnormal, pls have him do stress echo      Order entered. No DENAE to leave detailed message. LMTCB to discuss results and recommendations.

## 2020-09-30 NOTE — TELEPHONE ENCOUNTER
Spoke with Mr. Osiel Lyles, discussed abnormal stress test and order for stress echo including need for COVID testing prior. Central scheduling number provided. No questions at this time.

## 2020-10-04 ENCOUNTER — APPOINTMENT (OUTPATIENT)
Dept: LAB | Facility: HOSPITAL | Age: 65
End: 2020-10-04
Attending: NURSE PRACTITIONER
Payer: COMMERCIAL

## 2020-10-04 DIAGNOSIS — R94.39 ABNORMAL STRESS TEST: ICD-10-CM

## 2020-10-06 ENCOUNTER — OFFICE VISIT (OUTPATIENT)
Dept: CARDIOLOGY CLINIC | Facility: CLINIC | Age: 65
End: 2020-10-06
Payer: COMMERCIAL

## 2020-10-06 VITALS
BODY MASS INDEX: 21.7 KG/M2 | SYSTOLIC BLOOD PRESSURE: 128 MMHG | HEART RATE: 74 BPM | HEIGHT: 71 IN | WEIGHT: 155 LBS | DIASTOLIC BLOOD PRESSURE: 75 MMHG

## 2020-10-06 DIAGNOSIS — E78.49 OTHER HYPERLIPIDEMIA: ICD-10-CM

## 2020-10-06 DIAGNOSIS — I10 ESSENTIAL HYPERTENSION: ICD-10-CM

## 2020-10-06 DIAGNOSIS — R94.39 ABNORMAL STRESS TEST: Primary | ICD-10-CM

## 2020-10-06 PROCEDURE — 99214 OFFICE O/P EST MOD 30 MIN: CPT | Performed by: INTERNAL MEDICINE

## 2020-10-06 PROCEDURE — 3008F BODY MASS INDEX DOCD: CPT | Performed by: INTERNAL MEDICINE

## 2020-10-06 PROCEDURE — 3078F DIAST BP <80 MM HG: CPT | Performed by: INTERNAL MEDICINE

## 2020-10-06 PROCEDURE — 3074F SYST BP LT 130 MM HG: CPT | Performed by: INTERNAL MEDICINE

## 2020-10-06 NOTE — PROGRESS NOTES
[facility] CL[facility]  PROGRESS NOTE    Farida Mai is a 60year old male. Patient presents with:   Follow - Up    HPI:   This is a pleasant 60year old male with hypertension and elevated cholesterol who presents for cardiac follow-up after seeing the APN re Never Smoker      Smokeless tobacco: Never Used    Alcohol use:  Yes      Alcohol/week: 0.0 standard drinks      Comment: socially    Drug use: No    Family History  Family History   Problem Relation Age of Onset   • Cancer Mother    • Heart Disease Father improve.              Sherrie Chavira MD  10/6/2020

## 2020-10-06 NOTE — PATIENT INSTRUCTIONS
Continue present meds for good control of blood pressure and cholesterol    Get stress echocardiogram tomorrow as scheduled.   If normal no additional testing is required unless new symptoms

## 2020-10-07 ENCOUNTER — HOSPITAL ENCOUNTER (OUTPATIENT)
Dept: CV DIAGNOSTICS | Facility: HOSPITAL | Age: 65
Discharge: HOME | End: 2020-10-07
Attending: NURSE PRACTITIONER
Payer: COMMERCIAL

## 2020-10-07 DIAGNOSIS — R94.39 ABNORMAL STRESS TEST: ICD-10-CM

## 2020-10-07 PROCEDURE — 93018 CV STRESS TEST I&R ONLY: CPT | Performed by: NURSE PRACTITIONER

## 2020-10-07 PROCEDURE — 93350 STRESS TTE ONLY: CPT | Performed by: NURSE PRACTITIONER

## 2020-10-07 PROCEDURE — 93016 CV STRESS TEST SUPVJ ONLY: CPT | Performed by: NURSE PRACTITIONER

## 2020-10-07 PROCEDURE — 93017 CV STRESS TEST TRACING ONLY: CPT | Performed by: NURSE PRACTITIONER

## 2020-10-12 ENCOUNTER — TELEPHONE (OUTPATIENT)
Dept: CARDIOLOGY CLINIC | Facility: CLINIC | Age: 65
End: 2020-10-12

## 2020-10-12 NOTE — TELEPHONE ENCOUNTER
Audrey rasmussen, Jet Liang., RN   10/8/2020  5:48 PM      Reviewed with Dr. Virginia Chaparro, results okay as long as patient without symptoms (resolved by 10/6/20).     SONIA Drake APRN P  Cardio Clinical Sta

## 2020-10-13 NOTE — TELEPHONE ENCOUNTER
valery found in media. S/w Bill, identifiers given, relayed test results, discussed how he is feeling and that he will contact us if Sx continue. States he has been \"feeling good with just a rare occurrence of heart sensation , meaning his PAC.  \" to call

## 2020-10-26 RX ORDER — LOSARTAN POTASSIUM 25 MG/1
25 TABLET ORAL DAILY
Qty: 30 TABLET | Refills: 5 | Status: SHIPPED | OUTPATIENT
Start: 2020-10-26 | End: 2020-11-06

## 2020-10-26 NOTE — TELEPHONE ENCOUNTER
Christopher requesting refills for Losartan. Please call - patient is at [facility] waiting for refills. Thank you.     Current Outpatient Medications   Medication Sig Dispense Refill   • Losartan Potassium 25 MG Oral Tab Take 1 tablet (25 mg total) by mouth brigette

## 2020-11-06 ENCOUNTER — PATIENT MESSAGE (OUTPATIENT)
Dept: CARDIOLOGY CLINIC | Facility: CLINIC | Age: 65
End: 2020-11-06

## 2020-11-06 ENCOUNTER — PATIENT MESSAGE (OUTPATIENT)
Dept: NEPHROLOGY | Facility: CLINIC | Age: 65
End: 2020-11-06

## 2020-11-06 DIAGNOSIS — E78.00 PURE HYPERCHOLESTEROLEMIA: Primary | ICD-10-CM

## 2020-11-06 RX ORDER — LOSARTAN POTASSIUM 25 MG/1
25 TABLET ORAL DAILY
Qty: 90 TABLET | Refills: 1 | Status: SHIPPED | OUTPATIENT
Start: 2020-11-06 | End: 2021-04-23

## 2020-11-06 RX ORDER — ZOLPIDEM TARTRATE 10 MG/1
TABLET ORAL
Qty: 30 TABLET | Refills: 0 | Status: SHIPPED | OUTPATIENT
Start: 2020-11-06 | End: 2021-05-26

## 2020-11-06 RX ORDER — ATORVASTATIN CALCIUM 20 MG/1
20 TABLET, FILM COATED ORAL NIGHTLY
Qty: 90 TABLET | Refills: 1 | Status: SHIPPED | OUTPATIENT
Start: 2020-11-06 | End: 2021-06-24

## 2020-11-06 RX ORDER — HYDROCHLOROTHIAZIDE 12.5 MG/1
12.5 CAPSULE, GELATIN COATED ORAL DAILY
Qty: 90 CAPSULE | Refills: 1 | Status: SHIPPED | OUTPATIENT
Start: 2020-11-06 | End: 2021-01-22

## 2020-11-06 RX ORDER — ATORVASTATIN CALCIUM 20 MG/1
20 TABLET, FILM COATED ORAL NIGHTLY
Qty: 90 TABLET | Refills: 0 | Status: CANCELLED | OUTPATIENT
Start: 2020-11-06

## 2020-11-06 NOTE — TELEPHONE ENCOUNTER
From: Bambi Najera  To: Bronson Coreas MD  Sent: 11/6/2020 11:44 AM CST  Subject: Prescription Question    Dr. Richard Berry,    Is it possible to get my blood pressure and statin medications on the same renewal cycle.  My statin (Atorvastatin) has been requ

## 2020-11-06 NOTE — TELEPHONE ENCOUNTER
Last lipid panel was done on 5/9/2020. Last seen 8/17/2020 (Virtual) Return to clinic in 6 months (2/2021) Refill(s) pended and routed to Dr. Vikki Alvarez.

## 2020-11-06 NOTE — TELEPHONE ENCOUNTER
From: Jasmin Andrews  To: SONIA Machuca  Sent: 11/6/2020 11:47 AM CST  Subject: Test Results Question    Pham Martinez,     Can I schedule a quick call with you to discuss my last stress test. My conversation with the nurse was not completely cl

## 2020-11-12 ENCOUNTER — LAB ENCOUNTER (OUTPATIENT)
Dept: LAB | Facility: HOSPITAL | Age: 65
End: 2020-11-12
Attending: INTERNAL MEDICINE
Payer: COMMERCIAL

## 2020-11-12 DIAGNOSIS — E78.00 PURE HYPERCHOLESTEROLEMIA: ICD-10-CM

## 2020-11-12 PROCEDURE — 36415 COLL VENOUS BLD VENIPUNCTURE: CPT

## 2020-11-12 PROCEDURE — 80076 HEPATIC FUNCTION PANEL: CPT

## 2020-11-12 PROCEDURE — 80061 LIPID PANEL: CPT

## 2021-01-22 RX ORDER — HYDROCHLOROTHIAZIDE 12.5 MG/1
CAPSULE, GELATIN COATED ORAL
Qty: 90 CAPSULE | Refills: 0 | Status: SHIPPED | OUTPATIENT
Start: 2021-01-22 | End: 2021-10-15

## 2021-01-22 NOTE — TELEPHONE ENCOUNTER
Last seen 8/17/2020 (Virtual) Return to clinic in 6 months (2/21) Refill(s) pended and routed to Dr. Curt Curtis.

## 2021-01-25 ENCOUNTER — PATIENT MESSAGE (OUTPATIENT)
Dept: NEPHROLOGY | Facility: CLINIC | Age: 66
End: 2021-01-25

## 2021-01-25 NOTE — TELEPHONE ENCOUNTER
From: Ady Buitrago  To: Jose Bishop MD  Sent: 1/25/2021 7:16 AM CST  Subject: Other    Will Dr. Diann Solis be providing Covid-19 vaccine shots? If so, what is the planned protocol and timing?     Thank you,    Brian Mir

## 2021-03-19 RX ORDER — ACYCLOVIR 400 MG/1
TABLET ORAL
Qty: 60 TABLET | Refills: 2 | Status: SHIPPED | OUTPATIENT
Start: 2021-03-19

## 2021-03-19 NOTE — TELEPHONE ENCOUNTER
Last seen 8/17/2020 (Virtual) Return to clinic in 6 months (2/21) No follow up scheduled. Refill(s) pended and routed to Dr. Fernanda Manjarrez.

## 2021-04-13 ENCOUNTER — PATIENT MESSAGE (OUTPATIENT)
Dept: NEPHROLOGY | Facility: CLINIC | Age: 66
End: 2021-04-13

## 2021-04-14 NOTE — TELEPHONE ENCOUNTER
From: Bambi Najera  To:  Bronson Coreas MD  Sent: 4/13/2021 7:43 PM CDT  Subject: Non-Urgent Medical Question    I have gotten both Covid-19 vaccine shots [employer] - see photo below) and [facility] i

## 2021-04-23 DIAGNOSIS — E78.00 HYPERCHOLESTEREMIA: Primary | ICD-10-CM

## 2021-04-23 DIAGNOSIS — Z00.00 ROUTINE GENERAL MEDICAL EXAMINATION AT A HEALTH CARE FACILITY: ICD-10-CM

## 2021-04-23 DIAGNOSIS — E78.00 PURE HYPERCHOLESTEROLEMIA: ICD-10-CM

## 2021-04-23 DIAGNOSIS — I10 ESSENTIAL HYPERTENSION: ICD-10-CM

## 2021-04-23 RX ORDER — LOSARTAN POTASSIUM 25 MG/1
TABLET ORAL
Qty: 30 TABLET | Refills: 0 | Status: SHIPPED | OUTPATIENT
Start: 2021-04-23 | End: 2021-10-08

## 2021-04-27 NOTE — TELEPHONE ENCOUNTER
Spoke with patient, lab orders placed. F/U apt booked. Pt informed 12 hour fast is required for lab work.

## 2021-05-26 RX ORDER — ZOLPIDEM TARTRATE 10 MG/1
TABLET ORAL
Qty: 30 TABLET | Refills: 0 | Status: SHIPPED | OUTPATIENT
Start: 2021-05-26 | End: 2021-11-11

## 2021-05-26 NOTE — TELEPHONE ENCOUNTER
Last seen 8/17/2020 (Virtual) Return to clinic in 6 months (4/21) Followup scheduled for 6/8/21. Refill(s) pended and routed to Dr. Fernanda Manjarrez.

## 2021-06-01 ENCOUNTER — TELEPHONE (OUTPATIENT)
Dept: NEPHROLOGY | Facility: CLINIC | Age: 66
End: 2021-06-01

## 2021-06-01 ENCOUNTER — LAB ENCOUNTER (OUTPATIENT)
Dept: LAB | Facility: HOSPITAL | Age: 66
End: 2021-06-01
Attending: INTERNAL MEDICINE
Payer: COMMERCIAL

## 2021-06-01 DIAGNOSIS — Z00.00 ROUTINE GENERAL MEDICAL EXAMINATION AT A HEALTH CARE FACILITY: ICD-10-CM

## 2021-06-01 DIAGNOSIS — E78.00 PURE HYPERCHOLESTEROLEMIA: ICD-10-CM

## 2021-06-01 DIAGNOSIS — R31.9 HEMATURIA, UNSPECIFIED TYPE: Primary | ICD-10-CM

## 2021-06-01 DIAGNOSIS — I10 ESSENTIAL HYPERTENSION: ICD-10-CM

## 2021-06-01 DIAGNOSIS — E78.00 HYPERCHOLESTEREMIA: ICD-10-CM

## 2021-06-01 PROCEDURE — 81001 URINALYSIS AUTO W/SCOPE: CPT

## 2021-06-01 PROCEDURE — 80053 COMPREHEN METABOLIC PANEL: CPT

## 2021-06-01 PROCEDURE — 85025 COMPLETE CBC W/AUTO DIFF WBC: CPT

## 2021-06-01 PROCEDURE — 80061 LIPID PANEL: CPT

## 2021-06-01 PROCEDURE — 36415 COLL VENOUS BLD VENIPUNCTURE: CPT

## 2021-06-01 NOTE — TELEPHONE ENCOUNTER
Patient contacted. Results message relayed. Patient denies any symptoms and will do tests one day before his upcoming appointment on 6/8/21. Orders entered in system. Informed patient no fasting is needed for tests.

## 2021-06-01 NOTE — TELEPHONE ENCOUNTER
Labs are good except for trace of blood was seen in the urine. Any symptoms? .  Repeat a urinalysis and urine culture a day before upcoming visit.

## 2021-06-07 ENCOUNTER — LAB ENCOUNTER (OUTPATIENT)
Dept: LAB | Facility: HOSPITAL | Age: 66
End: 2021-06-07
Attending: INTERNAL MEDICINE
Payer: COMMERCIAL

## 2021-06-07 DIAGNOSIS — R31.9 HEMATURIA, UNSPECIFIED TYPE: ICD-10-CM

## 2021-06-07 PROCEDURE — 87086 URINE CULTURE/COLONY COUNT: CPT

## 2021-06-07 PROCEDURE — 81003 URINALYSIS AUTO W/O SCOPE: CPT

## 2021-06-08 ENCOUNTER — OFFICE VISIT (OUTPATIENT)
Dept: NEPHROLOGY | Facility: CLINIC | Age: 66
End: 2021-06-08
Payer: COMMERCIAL

## 2021-06-08 VITALS
HEART RATE: 56 BPM | BODY MASS INDEX: 21.45 KG/M2 | DIASTOLIC BLOOD PRESSURE: 60 MMHG | WEIGHT: 153.19 LBS | HEIGHT: 71 IN | SYSTOLIC BLOOD PRESSURE: 108 MMHG

## 2021-06-08 DIAGNOSIS — Z00.00 ROUTINE GENERAL MEDICAL EXAMINATION AT A HEALTH CARE FACILITY: Primary | ICD-10-CM

## 2021-06-08 DIAGNOSIS — I10 ESSENTIAL HYPERTENSION: ICD-10-CM

## 2021-06-08 DIAGNOSIS — E78.00 HYPERCHOLESTEREMIA: ICD-10-CM

## 2021-06-08 PROCEDURE — 3074F SYST BP LT 130 MM HG: CPT | Performed by: INTERNAL MEDICINE

## 2021-06-08 PROCEDURE — 3008F BODY MASS INDEX DOCD: CPT | Performed by: INTERNAL MEDICINE

## 2021-06-08 PROCEDURE — 99215 OFFICE O/P EST HI 40 MIN: CPT | Performed by: INTERNAL MEDICINE

## 2021-06-08 PROCEDURE — 3078F DIAST BP <80 MM HG: CPT | Performed by: INTERNAL MEDICINE

## 2021-06-08 RX ORDER — CETIRIZINE HYDROCHLORIDE 10 MG/1
10 TABLET ORAL DAILY
COMMUNITY

## 2021-06-08 NOTE — PROGRESS NOTES
[facility], [facility]  Nephrology Daily Progress Note    Jostin John  [id number]  63year old  Patient presents with: Follow - Up: FOLLOW UP BLOOD WORK DONE  LAST WEEK      HPI:   Jostin John is a 63year old male.   42-year-old male with a history of distress noted  Neck/Thyroid: neck is supple without adenopathy  Lymphatic: no abnormal cervical, supraclavicular or axillary adenopathy is noted  Respiratory: normal to inspection lungs are clear to auscultation bilaterally normal respiratory effort  Card 2  •  HYDROCHLOROTHIAZIDE 12.5 MG Oral Cap, TAKE 1 CAPSULE(12.5 MG) BY MOUTH DAILY, Disp: 90 capsule, Rfl: 0  •  atorvastatin 20 MG Oral Tab, Take 1 tablet (20 mg total) by mouth nightly., Disp: 90 tablet, Rfl: 1  •  Flaxseed, Linseed, (FLAX SEED OIL OR),

## 2021-06-17 ENCOUNTER — TELEPHONE (OUTPATIENT)
Dept: GASTROENTEROLOGY | Facility: CLINIC | Age: 66
End: 2021-06-17

## 2021-06-17 DIAGNOSIS — Z12.11 COLON CANCER SCREENING: Primary | ICD-10-CM

## 2021-06-17 NOTE — TELEPHONE ENCOUNTER
Patient had a recall for CLN in May 2020 but because of COVID did not schedule. Requesting to schedule procedure. Please call. Thank you.

## 2021-06-18 NOTE — TELEPHONE ENCOUNTER
Schedulers:  Please contact patient to schedule colonoscopy per below orders from Dr. Amado Potter.     Thank you

## 2021-06-18 NOTE — TELEPHONE ENCOUNTER
Routed to Dr. Laya Angel to advise on colonoscopy Devin Middleton out of office. Past procedure report attached in below encounter.       Last Procedure, Date, MD:  Colonoscopy Dr. Laya Angel 5/1/2010  Last Diagnosis:  Internal hemorrhoids  Recalled for (mth/yrs): 10

## 2021-06-18 NOTE — TELEPHONE ENCOUNTER
Colonoscopy for colon cancer screening  colyte sent to pharmacy   MAC sedation   Med adjustment as per anesthesia protocol

## 2021-06-22 NOTE — TELEPHONE ENCOUNTER
Tried contacting patient. Phone was answered, there was talking in the background and then patient stated \"let me call you back\" and hung up the phone.

## 2021-06-25 RX ORDER — ATORVASTATIN CALCIUM 20 MG/1
20 TABLET, FILM COATED ORAL NIGHTLY
Qty: 90 TABLET | Refills: 1 | Status: SHIPPED | OUTPATIENT
Start: 2021-06-25 | End: 2021-10-15

## 2021-06-25 NOTE — TELEPHONE ENCOUNTER
Patient is requesting more than a 90 day refill. Last seen 6/8/21. Last lipid panel was done on 6/1/21. Refill(s) pended and routed to Dr. Curt Curtis.

## 2021-06-29 RX ORDER — PEG-3350 AND ELECTROLYTES 240; 5.84; 2.98; 6.72; 22.72 G/4L; G/4L; G/4L; G/4L; G/4L
4 POWDER, FOR SOLUTION NASOGASTRIC ONCE
Qty: 4000 ML | Refills: 0 | Status: SHIPPED | OUTPATIENT
Start: 2021-06-29 | End: 2021-06-29

## 2021-06-29 NOTE — TELEPHONE ENCOUNTER
Dr. Edouard Gamma--    This patient is scheduled    Please send his Colyte prep to the pharmacy on file.  Thank you    You may close this TE when done :)

## 2021-06-29 NOTE — TELEPHONE ENCOUNTER
Scheduled for:  Colonoscopy 94621  Provider Name:  Dr. Tete Espinoza  Date:  8/3/21  Location:    [facility]  Sedation:  MAC  Time:  1100 (pt is aware to arrive at 1000)   Prep:  Colyte, sent via [address] [place] [address] [address] on 6/30/21  Meds/Allergies Reconciled?:  Physician reviewed   Diag

## 2021-07-29 ENCOUNTER — TELEPHONE (OUTPATIENT)
Dept: GASTROENTEROLOGY | Facility: CLINIC | Age: 66
End: 2021-07-29

## 2021-07-29 NOTE — TELEPHONE ENCOUNTER
Patient contacted. All questions answered. He takes his Losartan at night and is aware to hold this the night before the procedure. Aware to hold all supplements/vitamins 7 days prior to procedure.

## 2021-07-29 NOTE — TELEPHONE ENCOUNTER
pt has questions for the nurse about the instructions for taking med. Pt. wants to confirm if he will need to stop Blood pressure med 1 week before proc., or 1 day before the proc. ?

## 2021-07-30 ENCOUNTER — TELEPHONE (OUTPATIENT)
Dept: GASTROENTEROLOGY | Facility: CLINIC | Age: 66
End: 2021-07-30

## 2021-07-30 NOTE — TELEPHONE ENCOUNTER
Patient contacted. Wanted to know if he could go to a meeting at work at 2pm after the procedure. Aware that it is recommended that he go home and rest after the procedure and not to drive, work, or operate heavy machinery.     Aware he can resume norm

## 2021-07-30 NOTE — PAT NURSING NOTE
Verified with patient that procedure will be performed at [facility] and not at [facility], address provided. Informed no visitors allowed in facility, patient verbalized understanding and agreed.     Per IC, pt does not require a covid

## 2021-08-03 ENCOUNTER — ANESTHESIA (OUTPATIENT)
Dept: ENDOSCOPY | Age: 66
End: 2021-08-03
Payer: COMMERCIAL

## 2021-08-03 ENCOUNTER — HOSPITAL ENCOUNTER (OUTPATIENT)
Age: 66
Setting detail: HOSPITAL OUTPATIENT SURGERY
Discharge: HOME | End: 2021-08-03
Attending: INTERNAL MEDICINE | Admitting: INTERNAL MEDICINE
Payer: COMMERCIAL

## 2021-08-03 ENCOUNTER — ANESTHESIA EVENT (OUTPATIENT)
Dept: ENDOSCOPY | Age: 66
End: 2021-08-03
Payer: COMMERCIAL

## 2021-08-03 VITALS
RESPIRATION RATE: 20 BRPM | DIASTOLIC BLOOD PRESSURE: 62 MMHG | TEMPERATURE: 98 F | SYSTOLIC BLOOD PRESSURE: 106 MMHG | WEIGHT: 149 LBS | HEART RATE: 56 BPM | OXYGEN SATURATION: 100 % | BODY MASS INDEX: 20.86 KG/M2 | HEIGHT: 71 IN

## 2021-08-03 DIAGNOSIS — Z12.11 COLON CANCER SCREENING: ICD-10-CM

## 2021-08-03 PROCEDURE — 45385 COLONOSCOPY W/LESION REMOVAL: CPT | Performed by: INTERNAL MEDICINE

## 2021-08-03 PROCEDURE — 88305 TISSUE EXAM BY PATHOLOGIST: CPT | Performed by: INTERNAL MEDICINE

## 2021-08-03 RX ORDER — SODIUM CHLORIDE, SODIUM LACTATE, POTASSIUM CHLORIDE, CALCIUM CHLORIDE 600; 310; 30; 20 MG/100ML; MG/100ML; MG/100ML; MG/100ML
INJECTION, SOLUTION INTRAVENOUS CONTINUOUS
Status: DISCONTINUED | OUTPATIENT
Start: 2021-08-03 | End: 2021-08-03

## 2021-08-03 RX ADMIN — SODIUM CHLORIDE, SODIUM LACTATE, POTASSIUM CHLORIDE, CALCIUM CHLORIDE: 600; 310; 30; 20 INJECTION, SOLUTION INTRAVENOUS at 12:03:00

## 2021-08-03 RX ADMIN — SODIUM CHLORIDE, SODIUM LACTATE, POTASSIUM CHLORIDE, CALCIUM CHLORIDE: 600; 310; 30; 20 INJECTION, SOLUTION INTRAVENOUS at 11:39:00

## 2021-08-03 NOTE — H&P
History & Physical Examination    Patient Name: César Garza  MRN: [id number]  CSN: [id number]  YOB: 1955    Diagnosis:     Colon cancer screening      atorvastatin 20 MG Oral Tab, Take 1 tablet (20 mg total) by mouth nightly., Disp: 90 Check if Review is Normal Check if Physical Exam is Normal If not normal, please explain:   HEENT [x ] [ x]    NECK & BACK [x ] [x ]    HEART [x ] [ x]    LUNGS [x ] [ x]    ABDOMEN [x ] [x ]    UROGENITAL [ ] [ ]    EXTREMITIES [x ] [x ]    OTHER        [

## 2021-08-03 NOTE — OPERATIVE REPORT
[facility] HOSP - [facility] Endoscopy Report      Preoperative Diagnosis:  - colon cancer screening      Postoperative Diagnosis:  - colon polyps x 2  - internal hemorrhoids      Procedure:    Colonoscopy      Surgeon:  Patrick Zuniga M.D.     Anesthesia:

## 2021-08-03 NOTE — ANESTHESIA POSTPROCEDURE EVALUATION
Patient: Winston Shone    Procedure Summary     Date: 08/03/21 Room / Location: [facility] ENDOSCOPY 01 / [facility] ENDO    Anesthesia Start: 7146 Anesthesia Stop: 8438    Procedure: COLONOSCOPY (N/A ) Diagnosis:       Colon cancer screening      (polyps, hemorr

## 2021-08-03 NOTE — ANESTHESIA PREPROCEDURE EVALUATION
Anesthesia PreOp Note    HPI:     Pamela Downs is a 63year old male who presents for preoperative consultation requested by: Mishel Santo MD    Date of Surgery: 8/3/2021    Procedure(s):  COLONOSCOPY  Indication: Colon cancer screening    Kaitlyn Choi (VITAMIN D) 1000 UNITS Oral Cap, Take 1 tablet by mouth daily. , Disp: , Rfl: , 7/26/2021  Wheat Dextrin (BENEFIBER DRINK MIX OR), Take  by mouth., Disp: , Rfl: , 7/26/2021  cetirizine 10 MG Oral Tab, Take 10 mg by mouth daily. , Disp: , Rfl:       lactated Activity:       Days of Exercise per Week:       Minutes of Exercise per Session:   Stress:       Feeling of Stress :   Social Connections:       Frequency of Communication with Friends and Family:       Frequency of Social Gatherings with Friends and Fami MAC  Post-op Pain Management: IV analgesics  Plan Comments: NPO for procedure  Informed Consent Plan and Risks Discussed With:  Patient  Discussed plan with:  Surgeon      I have informed Francisco Somers and/or legal guardian or family member of the natur

## 2021-08-04 ENCOUNTER — TELEPHONE (OUTPATIENT)
Dept: GASTROENTEROLOGY | Facility: CLINIC | Age: 66
End: 2021-08-04

## 2021-08-04 NOTE — TELEPHONE ENCOUNTER
----- Message from Colten Fischer MD sent at 8/4/2021  4:39 PM CDT -----  Karmen Mcdaniels,   I wanted to get back to you with your colonoscopy results. You had 2 colon polyps removed which were benign.   I would advise a repeat colonoscopy in 7 years to make miracle

## 2021-08-04 NOTE — TELEPHONE ENCOUNTER
Health Maintenance Updated. 7 year colonoscopy recall entered into patient outreach in [facility]. Next colonoscopy will be due 8/3/2028. Result letter generated and mailed to patient's home address.

## 2021-09-29 ENCOUNTER — OFFICE VISIT (OUTPATIENT)
Dept: DERMATOLOGY CLINIC | Facility: CLINIC | Age: 66
End: 2021-09-29
Payer: COMMERCIAL

## 2021-09-29 DIAGNOSIS — D48.5 NEOPLASM OF UNCERTAIN BEHAVIOR OF SKIN: Primary | ICD-10-CM

## 2021-09-29 DIAGNOSIS — L21.9 SEBORRHEIC DERMATITIS, UNSPECIFIED: ICD-10-CM

## 2021-09-29 PROCEDURE — 99202 OFFICE O/P NEW SF 15 MIN: CPT | Performed by: DERMATOLOGY

## 2021-09-29 PROCEDURE — 88312 SPECIAL STAINS GROUP 1: CPT | Performed by: DERMATOLOGY

## 2021-09-29 PROCEDURE — 88305 TISSUE EXAM BY PATHOLOGIST: CPT | Performed by: DERMATOLOGY

## 2021-10-05 NOTE — PROGRESS NOTES
The pathology report from last visit showed  keratin debris and foreign material, likely precipitates from pool, PAS negative- no fungus. Fungal culture is pending. Please log in test results, send biopsy results letter. Pt to rtc 1 year or prn.

## 2021-10-08 RX ORDER — LOSARTAN POTASSIUM 25 MG/1
25 TABLET ORAL DAILY
Qty: 90 TABLET | Refills: 0 | Status: SHIPPED | OUTPATIENT
Start: 2021-10-08 | End: 2022-01-13

## 2021-10-08 NOTE — TELEPHONE ENCOUNTER
Last seen 6/8/21. Return to Clinic in 6 months (12/21) Refill(s) pended and routed to Dr. Melissa Meza.

## 2021-10-11 NOTE — PROGRESS NOTES
Blayne Holder is a 63year old male.   HPI:     CC:  Patient presents with:  Hair/Scalp Problem: New Pt, pt presents for scalp issues, was swimming 3 weeks ago is now having scalp issues, denies any itchiness no rash, denies any family or personal skin c Tab TAKE 1 TABLET BY MOUTH TWICE DAILY (Patient taking differently: as needed.  TAKE 1 TABLET BY MOUTH TWICE DAILY ) 60 tablet 2     Allergies:   No Known Allergies    Past Medical History:   Diagnosis Date   • Deviated septum 1990   • High blood pressure file      Lack of Transportation (Non-Medical):  Not on file  Physical Activity:       Days of Exercise per Week: Not on file      Minutes of Exercise per Session: Not on file  Stress:       Feeling of Stress : Not on file  Social Connections:       Herminio Fajardo medications, allergies reviewed as noted. ROS:  Denies any other systemic complaints. No new or changeing lesions other than noted above. No fevers, chills, night sweats, unusual sun sensitivity. No other skin complaints.         History, medications hydrocortisone, prescription triamcinolone if this becomes more bothersome    No other susupicious lesions on todays  exam.    Please refer to map for specific lesions. See additional diagnoses. Pros cons of various therapies, risks benefits discussed. Pa

## 2021-10-15 RX ORDER — HYDROCHLOROTHIAZIDE 12.5 MG/1
CAPSULE, GELATIN COATED ORAL
Qty: 90 CAPSULE | Refills: 1 | Status: SHIPPED | OUTPATIENT
Start: 2021-10-15

## 2021-10-15 RX ORDER — ATORVASTATIN CALCIUM 20 MG/1
TABLET, FILM COATED ORAL
Qty: 90 TABLET | Refills: 1 | Status: SHIPPED | OUTPATIENT
Start: 2021-10-15

## 2021-11-12 RX ORDER — ZOLPIDEM TARTRATE 10 MG/1
TABLET ORAL
Qty: 30 TABLET | Refills: 0 | Status: SHIPPED | OUTPATIENT
Start: 2021-11-12

## 2021-11-17 ENCOUNTER — PATIENT MESSAGE (OUTPATIENT)
Dept: DERMATOLOGY CLINIC | Facility: CLINIC | Age: 66
End: 2021-11-17

## 2021-11-17 NOTE — TELEPHONE ENCOUNTER
LOV 9/29/21, please see pt's msg - any advice of would you prefer follow up at this point? Copied from result note:      Cassie Hoang Mr. Norwood,  The final culture showed Pleosporales, filamentous fungus, this type of fungus rows and plan material in fresh wa

## 2021-11-19 RX ORDER — KETOCONAZOLE 20 MG/ML
SHAMPOO TOPICAL
Qty: 120 ML | Refills: 11 | Status: SHIPPED | OUTPATIENT
Start: 2021-11-19

## 2021-11-19 RX ORDER — FLUCONAZOLE 100 MG/1
100 TABLET ORAL DAILY
Qty: 14 TABLET | Refills: 0 | Status: SHIPPED | OUTPATIENT
Start: 2021-11-19 | End: 2021-12-07

## 2021-11-19 NOTE — TELEPHONE ENCOUNTER
This is not an invasive fungus. I don't know that this is causing the itching since this can be scraped off this skin.     He can wash with the ketoconazole shampoo and take the fluconazole if this not helping then f/u appt

## 2021-12-07 RX ORDER — FLUCONAZOLE 100 MG/1
100 TABLET ORAL DAILY
Qty: 14 TABLET | Refills: 0 | Status: SHIPPED | OUTPATIENT
Start: 2021-12-07

## 2022-01-14 RX ORDER — LOSARTAN POTASSIUM 25 MG/1
TABLET ORAL
Qty: 90 TABLET | Refills: 0 | Status: SHIPPED | OUTPATIENT
Start: 2022-01-14

## 2022-01-18 NOTE — TELEPHONE ENCOUNTER
Spoke with patients wife and she states they will be moving to [place] and will start to find a new PCP soon.

## 2022-02-14 ENCOUNTER — PATIENT MESSAGE (OUTPATIENT)
Dept: NEPHROLOGY | Facility: CLINIC | Age: 67
End: 2022-02-14

## 2022-02-15 RX ORDER — ATORVASTATIN CALCIUM 20 MG/1
20 TABLET, FILM COATED ORAL NIGHTLY
Qty: 90 TABLET | Refills: 0 | Status: SHIPPED | OUTPATIENT
Start: 2022-02-15

## 2022-02-24 ENCOUNTER — TELEPHONE (OUTPATIENT)
Dept: NEPHROLOGY | Facility: CLINIC | Age: 67
End: 2022-02-24

## 2022-02-24 NOTE — TELEPHONE ENCOUNTER
Called patient ( name and  identified )  in regards to letter sent stating Dr. Guero Waller can no longer be the PCP. Patient given an apology and informed of the error. Informed the chart has been corrected and no action needed on the part of the patient. Patient verbalizes understanding.

## 2022-03-14 RX ORDER — HYDROCHLOROTHIAZIDE 12.5 MG/1
12.5 CAPSULE, GELATIN COATED ORAL DAILY
Qty: 90 CAPSULE | Refills: 0 | Status: SHIPPED | OUTPATIENT
Start: 2022-03-14

## 2022-05-24 RX ORDER — ATORVASTATIN CALCIUM 20 MG/1
20 TABLET, FILM COATED ORAL NIGHTLY
Qty: 90 TABLET | Refills: 0 | Status: SHIPPED | OUTPATIENT
Start: 2022-05-24

## 2022-05-24 NOTE — TELEPHONE ENCOUNTER
Last seen 6/8/21 with labs at the same time, patient received error call and PCP still listed as Dr. Ez Yeboahr. No follow up currently scheduled.   Please advise on refill request.

## 2022-05-26 RX ORDER — ZOLPIDEM TARTRATE 10 MG/1
10 TABLET ORAL NIGHTLY
Qty: 30 TABLET | Refills: 3 | OUTPATIENT
Start: 2022-05-26

## 2022-05-26 NOTE — TELEPHONE ENCOUNTER
Dr Draper Stageambika patient is requesting a refill last visit on 6/8/21 aware that pt need to establish with new PCP ,pt is waiting for his medicare to kick in a few months asking if can do refills until then,please sign pending order.

## 2022-05-27 ENCOUNTER — TELEPHONE (OUTPATIENT)
Dept: NEPHROLOGY | Facility: CLINIC | Age: 67
End: 2022-05-27

## (undated) DEVICE — Device: Brand: DEFENDO AIR/WATER/SUCTION AND BIOPSY VALVE

## (undated) DEVICE — LINE MNTR ADLT SET O2 INTMD

## (undated) DEVICE — SNARE ENDOSCOPIC 10MM ROUND

## (undated) DEVICE — Device: Brand: CUSTOM PROCEDURE KIT

## (undated) DEVICE — MEDI-VAC NON-CONDUCTIVE SUCTION TUBING 6MM X 1.8M (6FT.) L: Brand: CARDINAL HEALTH

## (undated) DEVICE — SNARE OPTMZ PLPCTM TRP

## (undated) NOTE — MR AVS SNAPSHOT
Kelsie  [address]  [phone]               Thank you for choosing us for your health care visit with Philippe Samaniego MD.  We are glad to serve you and happy to provide you with this summary information, go to [email or web address] and click on the Sign Up Now link in the [employer] box. Enter your [employer] Activation Code exactly as it appears below along with your Zip Code and Date of Birth to complete the sign-up process.  If you do

## (undated) NOTE — LETTER
1/8/2018              Benny Rashid        [address]        [place] [facility] [address]         Dear Erinn Farrar records indicate that the blood tests ordered for you by Fabiano Perez MD  have not been done.   If you have, in fact, already completed the

## (undated) NOTE — LETTER
3/8/2019              Tello Shetty        [address]         Dear Garret Mckoy records indicate that the lab tests ordered for you by Maisha Donald MD  have not been done.   If you have, in fact, already completed the t

## (undated) NOTE — LETTER
[address], [place]  Authorization for Invasive Procedures  1.  I hereby authorize Dr. Ligia Mejia , my physician and whomever may be designated as the doctor's assistant, to perform the following operation and/or procedure:  Colonoscop allergic reactions, hemolytic reactions, transmission of disease such as hepatitis, AIDS, cytomegalovirus (CMV), and flluid overload.  In the event that I wish to have autologous transfusions of my own blood, or a directed donor transfusion, I will discuss Patient:  ________________________________________________ Date: _________Time: _________    Responsible person in case of minor or unconscious: _____________________________Relationship: ____________     Witness Signature: ________________________________

## (undated) NOTE — MR AVS SNAPSHOT
After Visit Summary   9/29/2021    Antoinette Doty   MRN: [id number]           Visit Information     Date & Time  9/29/2021 10:30 AM Provider  Michael Lester MD Department  [facility]  Phone  [phone]      Dominique Cat chat with a doctor face-to-face using your web-cam enabled computer or mobile device wherever you are. Video Visits cost $50 and can be paid hassle-free using a credit, debit, or health savings card. Not active on [employer]?  Ask us how to get signed up toda

## (undated) NOTE — LETTER
6/29/2018              Verner Forte        [phone] [id number]         Dear [place] records indicate that the blood tests ordered for you by Favio Mims MD  have not been done. If you have, in fact, already completed the tests or you do not wish to have the tests done, please contact our office at [address] [place] [address]. Otherwise, please proceed with the testing. Enclosed is a duplicate order for your convenience. Please fast for 12 hours.         Sincerely,    Favio Mims MD  [address]  [phone]        Document electronically generated by:  Jose Gonzalez

## (undated) NOTE — LETTER
8/4/2021              Jasmin Andrews        [address]          Dear Prince bermudez,      I wanted to get back to you with your colonoscopy results.  You had 2 colon polyps removed which were benign.  I would advise a repeat colon

## (undated) NOTE — LETTER
[facility]  Administration of Anesthesia  1. I, Maricruz March, or _________________________________ acting on his behalf, (Patient) (Dependent/Representative) request to receive anesthesia for my pending procedure/operation/treatment. bleeding, seizure, cardiac arrest and death. 7. AWARENESS: I understand that it is possible (but unlikely) to have explicit memory of events from the operating room while under general anesthesia.   8. ELECTROCONVULSIVE THERAPY PATIENTS: This consent serve below affirms that prior to the time of the procedure, I have explained to the patient and/or his/her guardian, the risks and benefits of undergoing anesthesia, as well as any reasonable alternatives.     ___________________________________________________